# Patient Record
Sex: MALE | Race: WHITE | Employment: FULL TIME | URBAN - NONMETROPOLITAN AREA
[De-identification: names, ages, dates, MRNs, and addresses within clinical notes are randomized per-mention and may not be internally consistent; named-entity substitution may affect disease eponyms.]

---
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
## 2019-07-03 ENCOUNTER — HOSPITAL ENCOUNTER (OUTPATIENT)
Dept: HOSPITAL 100 - HPRAD | Age: 19
End: 2019-07-03
Payer: COMMERCIAL

## 2019-07-03 DIAGNOSIS — M25.561: Primary | ICD-10-CM

## 2019-07-03 DIAGNOSIS — M25.562: ICD-10-CM

## 2019-07-03 PROCEDURE — 73564 X-RAY EXAM KNEE 4 OR MORE: CPT

## 2019-07-17 ENCOUNTER — HOSPITAL ENCOUNTER (OUTPATIENT)
Age: 19
End: 2019-07-17
Payer: COMMERCIAL

## 2019-07-17 DIAGNOSIS — S83.512A: Primary | ICD-10-CM

## 2019-07-17 PROCEDURE — 73721 MRI JNT OF LWR EXTRE W/O DYE: CPT

## 2019-07-26 ENCOUNTER — HOSPITAL ENCOUNTER (OUTPATIENT)
Dept: HOSPITAL 100 - SDC | Age: 19
Discharge: HOME | End: 2019-07-26
Payer: COMMERCIAL

## 2019-07-26 VITALS
OXYGEN SATURATION: 96 % | RESPIRATION RATE: 16 BRPM | SYSTOLIC BLOOD PRESSURE: 132 MMHG | HEART RATE: 69 BPM | DIASTOLIC BLOOD PRESSURE: 80 MMHG

## 2019-07-26 VITALS
RESPIRATION RATE: 16 BRPM | DIASTOLIC BLOOD PRESSURE: 80 MMHG | HEART RATE: 61 BPM | SYSTOLIC BLOOD PRESSURE: 125 MMHG | OXYGEN SATURATION: 96 %

## 2019-07-26 VITALS
DIASTOLIC BLOOD PRESSURE: 72 MMHG | OXYGEN SATURATION: 99 % | SYSTOLIC BLOOD PRESSURE: 135 MMHG | HEART RATE: 70 BPM | RESPIRATION RATE: 16 BRPM

## 2019-07-26 VITALS
TEMPERATURE: 97.34 F | DIASTOLIC BLOOD PRESSURE: 79 MMHG | RESPIRATION RATE: 16 BRPM | OXYGEN SATURATION: 100 % | HEART RATE: 98 BPM | SYSTOLIC BLOOD PRESSURE: 138 MMHG

## 2019-07-26 VITALS
RESPIRATION RATE: 18 BRPM | SYSTOLIC BLOOD PRESSURE: 127 MMHG | DIASTOLIC BLOOD PRESSURE: 63 MMHG | OXYGEN SATURATION: 100 % | HEART RATE: 67 BPM

## 2019-07-26 VITALS
SYSTOLIC BLOOD PRESSURE: 125 MMHG | DIASTOLIC BLOOD PRESSURE: 80 MMHG | TEMPERATURE: 98.6 F | RESPIRATION RATE: 16 BRPM | HEART RATE: 64 BPM | OXYGEN SATURATION: 97 %

## 2019-07-26 VITALS
DIASTOLIC BLOOD PRESSURE: 80 MMHG | HEART RATE: 68 BPM | TEMPERATURE: 97.4 F | SYSTOLIC BLOOD PRESSURE: 125 MMHG | OXYGEN SATURATION: 100 % | RESPIRATION RATE: 16 BRPM

## 2019-07-26 VITALS — BODY MASS INDEX: 25.9 KG/M2

## 2019-07-26 DIAGNOSIS — F12.90: ICD-10-CM

## 2019-07-26 DIAGNOSIS — S83.512D: Primary | ICD-10-CM

## 2019-07-26 PROCEDURE — 29888 ARTHRS AID ACL RPR/AGMNTJ: CPT

## 2019-07-26 PROCEDURE — 64450 NJX AA&/STRD OTHER PN/BRANCH: CPT

## 2019-07-26 RX ADMIN — CEFAZOLIN 150 GM: 10 INJECTION, POWDER, FOR SOLUTION INTRAVENOUS at 07:11

## 2019-07-26 RX ADMIN — MUPIROCIN 1 APPLIC: 20 OINTMENT TOPICAL at 08:50

## 2019-07-26 RX ADMIN — EPINEPHRINE 1 MG: 1 INJECTION INTRAMUSCULAR; INTRAVENOUS; SUBCUTANEOUS at 07:45

## 2019-07-26 RX ADMIN — HYDROCODONE BITARTRATE AND ACETAMINOPHEN 0 TABLET: 5; 325 TABLET ORAL at 11:06

## 2019-08-23 ENCOUNTER — HOSPITAL ENCOUNTER (OUTPATIENT)
Dept: HOSPITAL 100 - PT | Age: 19
Discharge: HOME | End: 2019-08-23
Payer: COMMERCIAL

## 2019-08-23 VITALS — BODY MASS INDEX: 25.9 KG/M2

## 2019-08-23 DIAGNOSIS — Z98.890: Primary | ICD-10-CM

## 2019-08-23 PROCEDURE — 97016 VASOPNEUMATIC DEVICE THERAPY: CPT

## 2019-08-23 PROCEDURE — 97110 THERAPEUTIC EXERCISES: CPT

## 2019-08-23 PROCEDURE — 97530 THERAPEUTIC ACTIVITIES: CPT

## 2019-08-23 PROCEDURE — 97161 PT EVAL LOW COMPLEX 20 MIN: CPT

## 2019-08-23 PROCEDURE — 97014 ELECTRIC STIMULATION THERAPY: CPT

## 2019-08-23 PROCEDURE — G0283 ELEC STIM OTHER THAN WOUND: HCPCS

## 2020-06-09 ENCOUNTER — HOSPITAL ENCOUNTER (OUTPATIENT)
Dept: HOSPITAL 100 - MFPLAB | Age: 20
Discharge: HOME | End: 2020-06-09
Payer: COMMERCIAL

## 2020-06-09 VITALS — BODY MASS INDEX: 25.9 KG/M2

## 2020-06-09 DIAGNOSIS — Z20.9: ICD-10-CM

## 2020-06-09 DIAGNOSIS — M26.609: ICD-10-CM

## 2020-06-09 DIAGNOSIS — M79.10: Primary | ICD-10-CM

## 2020-06-09 DIAGNOSIS — R68.82: ICD-10-CM

## 2020-06-09 LAB
ALANINE AMINOTRANSFER ALT/SGPT: 62 U/L (ref 16–61)
ALBUMIN SERPL-MCNC: 4 G/DL (ref 3.2–5)
ALKALINE PHOSPHATASE: 61 U/L (ref 45–117)
ANION GAP: 6 (ref 5–15)
AST(SGOT): 24 U/L (ref 15–37)
BUN SERPL-MCNC: 12 MG/DL (ref 7–18)
BUN/CREAT RATIO: 13.9 RATIO (ref 10–20)
CALCIUM SERPL-MCNC: 9.3 MG/DL (ref 8.5–10.1)
CARBON DIOXIDE: 27 MMOL/L (ref 21–32)
CHLORIDE: 105 MMOL/L (ref 98–107)
DEPRECATED RDW RBC: 42.2 FL (ref 35.1–43.9)
ERYTHROCYTE [DISTWIDTH] IN BLOOD: 12.2 % (ref 11.6–14.6)
EST GLOM FILT RATE - AFR AMER: 144 ML/MIN (ref 60–?)
FERRITIN SERPL-MCNC: 52 NG/ML (ref 26–388)
GLOBULIN: 3.1 G/DL (ref 2.2–4.2)
GLUCOSE: 88 MG/DL (ref 74–106)
HCT VFR BLD AUTO: 42.7 % (ref 40–54)
HEMOGLOBIN: 14.5 G/DL (ref 13–16.5)
HGB BLD-MCNC: 14.5 G/DL (ref 13–16.5)
IMMATURE GRANULOCYTES COUNT: 0.07 X10^3/UL (ref 0–0)
IRON BINDING CAPACITY,TOTAL: 369 UG/DL (ref 250–450)
IRON SATN MFR SERPL: 19.2 % (ref 15–55)
IRON SPEC-MCNT: 71 UG/DL (ref 65–175)
MCV RBC: 94.1 FL (ref 80–94)
MEAN CORP HGB CONC: 34 G/DL (ref 32–36)
MEAN PLATELET VOL.: 10.1 FL (ref 6.2–12)
NRBC FLAGGED BY ANALYZER: 0 % (ref 0–5)
PLATELET # BLD: 216 K/MM3 (ref 150–450)
PLATELET COUNT: 216 K/MM3 (ref 150–450)
POTASSIUM: 3.9 MMOL/L (ref 3.5–5.1)
RBC # BLD AUTO: 4.54 M/MM3 (ref 4.6–6.2)
RBC DISTRIBUTION WIDTH CV: 12.2 % (ref 11.6–14.6)
RBC DISTRIBUTION WIDTH SD: 42.2 FL (ref 35.1–43.9)
SAMPLE ADEQUACY CONTROL: (no result)
VITAMIN D,25 HYDROXY: 23.2 NG/ML
WBC # BLD AUTO: 8.1 K/MM3 (ref 4.4–11)
WHITE BLOOD COUNT: 8.1 K/MM3 (ref 4.4–11)

## 2020-06-09 PROCEDURE — 87591 N.GONORRHOEAE DNA AMP PROB: CPT

## 2020-06-09 PROCEDURE — 83540 ASSAY OF IRON: CPT

## 2020-06-09 PROCEDURE — 84403 ASSAY OF TOTAL TESTOSTERONE: CPT

## 2020-06-09 PROCEDURE — 36415 COLL VENOUS BLD VENIPUNCTURE: CPT

## 2020-06-09 PROCEDURE — 82728 ASSAY OF FERRITIN: CPT

## 2020-06-09 PROCEDURE — 86703 HIV-1/HIV-2 1 RESULT ANTBDY: CPT

## 2020-06-09 PROCEDURE — 84402 ASSAY OF FREE TESTOSTERONE: CPT

## 2020-06-09 PROCEDURE — 86592 SYPHILIS TEST NON-TREP QUAL: CPT

## 2020-06-09 PROCEDURE — 82306 VITAMIN D 25 HYDROXY: CPT

## 2020-06-09 PROCEDURE — 85025 COMPLETE CBC W/AUTO DIFF WBC: CPT

## 2020-06-09 PROCEDURE — 80053 COMPREHEN METABOLIC PANEL: CPT

## 2020-06-09 PROCEDURE — 87491 CHLMYD TRACH DNA AMP PROBE: CPT

## 2020-06-09 PROCEDURE — 83550 IRON BINDING TEST: CPT

## 2020-06-09 PROCEDURE — 84443 ASSAY THYROID STIM HORMONE: CPT

## 2020-06-11 LAB — RAPID PLASMIN REAGIN (RPR): NONREACTIVE

## 2020-06-16 LAB — TESTOSTERONE, FREE: 12.47 NG/DL (ref 5–21)

## 2020-06-17 LAB
TESTOST FREE MFR SERPL: 3.19 % (ref 1.5–4.2)
TESTOSTERONE, % FREE: 3.19 % (ref 1.5–4.2)

## 2023-06-16 ENCOUNTER — HOSPITAL ENCOUNTER (OUTPATIENT)
Dept: HOSPITAL 100 - MFPLAB | Age: 23
Discharge: HOME | End: 2023-06-16
Payer: COMMERCIAL

## 2023-06-16 DIAGNOSIS — T78.40XA: Primary | ICD-10-CM
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
## 2023-06-16 PROCEDURE — 86003 ALLG SPEC IGE CRUDE XTRC EA: CPT

## 2023-06-16 PROCEDURE — 36415 COLL VENOUS BLD VENIPUNCTURE: CPT
